# Patient Record
Sex: FEMALE | Race: OTHER | HISPANIC OR LATINO
[De-identification: names, ages, dates, MRNs, and addresses within clinical notes are randomized per-mention and may not be internally consistent; named-entity substitution may affect disease eponyms.]

---

## 2019-05-24 ENCOUNTER — APPOINTMENT (OUTPATIENT)
Dept: ANTEPARTUM | Facility: CLINIC | Age: 29
End: 2019-05-24
Payer: MEDICAID

## 2019-05-24 ENCOUNTER — ASOB RESULT (OUTPATIENT)
Age: 29
End: 2019-05-24

## 2019-05-24 PROBLEM — Z00.00 ENCOUNTER FOR PREVENTIVE HEALTH EXAMINATION: Status: ACTIVE | Noted: 2019-05-24

## 2019-05-24 PROCEDURE — 76830 TRANSVAGINAL US NON-OB: CPT

## 2019-05-24 PROCEDURE — 76857 US EXAM PELVIC LIMITED: CPT | Mod: 59

## 2020-06-10 ENCOUNTER — EMERGENCY (EMERGENCY)
Facility: HOSPITAL | Age: 30
LOS: 1 days | Discharge: HOME | End: 2020-06-10
Attending: STUDENT IN AN ORGANIZED HEALTH CARE EDUCATION/TRAINING PROGRAM
Payer: MEDICAID

## 2020-06-10 VITALS
DIASTOLIC BLOOD PRESSURE: 64 MMHG | HEIGHT: 66 IN | HEART RATE: 79 BPM | RESPIRATION RATE: 16 BRPM | WEIGHT: 164.91 LBS | SYSTOLIC BLOOD PRESSURE: 126 MMHG | TEMPERATURE: 98 F | OXYGEN SATURATION: 98 %

## 2020-06-10 LAB
ALBUMIN SERPL ELPH-MCNC: 3.9 G/DL — SIGNIFICANT CHANGE UP (ref 3.3–5.2)
ALP SERPL-CCNC: 40 U/L — SIGNIFICANT CHANGE UP (ref 40–120)
ALT FLD-CCNC: 16 U/L — SIGNIFICANT CHANGE UP
ANION GAP SERPL CALC-SCNC: 13 MMOL/L — SIGNIFICANT CHANGE UP (ref 5–17)
AST SERPL-CCNC: 21 U/L — SIGNIFICANT CHANGE UP
BASOPHILS # BLD AUTO: 0.03 K/UL — SIGNIFICANT CHANGE UP (ref 0–0.2)
BASOPHILS NFR BLD AUTO: 0.4 % — SIGNIFICANT CHANGE UP (ref 0–2)
BILIRUB SERPL-MCNC: <0.2 MG/DL — LOW (ref 0.4–2)
BUN SERPL-MCNC: 13 MG/DL — SIGNIFICANT CHANGE UP (ref 8–20)
CALCIUM SERPL-MCNC: 8.4 MG/DL — LOW (ref 8.6–10.2)
CHLORIDE SERPL-SCNC: 105 MMOL/L — SIGNIFICANT CHANGE UP (ref 98–107)
CO2 SERPL-SCNC: 21 MMOL/L — LOW (ref 22–29)
CREAT SERPL-MCNC: 0.52 MG/DL — SIGNIFICANT CHANGE UP (ref 0.5–1.3)
D DIMER BLD IA.RAPID-MCNC: <150 NG/ML DDU — SIGNIFICANT CHANGE UP
EOSINOPHIL # BLD AUTO: 0.13 K/UL — SIGNIFICANT CHANGE UP (ref 0–0.5)
EOSINOPHIL NFR BLD AUTO: 1.8 % — SIGNIFICANT CHANGE UP (ref 0–6)
GLUCOSE SERPL-MCNC: 78 MG/DL — SIGNIFICANT CHANGE UP (ref 70–99)
HCG SERPL-ACNC: <4 MIU/ML — SIGNIFICANT CHANGE UP
HCT VFR BLD CALC: 40.4 % — SIGNIFICANT CHANGE UP (ref 34.5–45)
HGB BLD-MCNC: 13.1 G/DL — SIGNIFICANT CHANGE UP (ref 11.5–15.5)
IMM GRANULOCYTES NFR BLD AUTO: 0.4 % — SIGNIFICANT CHANGE UP (ref 0–1.5)
LYMPHOCYTES # BLD AUTO: 2.45 K/UL — SIGNIFICANT CHANGE UP (ref 1–3.3)
LYMPHOCYTES # BLD AUTO: 33.7 % — SIGNIFICANT CHANGE UP (ref 13–44)
MCHC RBC-ENTMCNC: 29.2 PG — SIGNIFICANT CHANGE UP (ref 27–34)
MCHC RBC-ENTMCNC: 32.4 GM/DL — SIGNIFICANT CHANGE UP (ref 32–36)
MCV RBC AUTO: 90.2 FL — SIGNIFICANT CHANGE UP (ref 80–100)
MONOCYTES # BLD AUTO: 0.5 K/UL — SIGNIFICANT CHANGE UP (ref 0–0.9)
MONOCYTES NFR BLD AUTO: 6.9 % — SIGNIFICANT CHANGE UP (ref 2–14)
NEUTROPHILS # BLD AUTO: 4.12 K/UL — SIGNIFICANT CHANGE UP (ref 1.8–7.4)
NEUTROPHILS NFR BLD AUTO: 56.8 % — SIGNIFICANT CHANGE UP (ref 43–77)
PLATELET # BLD AUTO: 237 K/UL — SIGNIFICANT CHANGE UP (ref 150–400)
POTASSIUM SERPL-MCNC: 3.5 MMOL/L — SIGNIFICANT CHANGE UP (ref 3.5–5.3)
POTASSIUM SERPL-SCNC: 3.5 MMOL/L — SIGNIFICANT CHANGE UP (ref 3.5–5.3)
PROT SERPL-MCNC: 6.6 G/DL — SIGNIFICANT CHANGE UP (ref 6.6–8.7)
RBC # BLD: 4.48 M/UL — SIGNIFICANT CHANGE UP (ref 3.8–5.2)
RBC # FLD: 13.3 % — SIGNIFICANT CHANGE UP (ref 10.3–14.5)
SODIUM SERPL-SCNC: 139 MMOL/L — SIGNIFICANT CHANGE UP (ref 135–145)
TROPONIN T SERPL-MCNC: <0.01 NG/ML — SIGNIFICANT CHANGE UP (ref 0–0.06)
WBC # BLD: 7.26 K/UL — SIGNIFICANT CHANGE UP (ref 3.8–10.5)
WBC # FLD AUTO: 7.26 K/UL — SIGNIFICANT CHANGE UP (ref 3.8–10.5)

## 2020-06-10 PROCEDURE — 85027 COMPLETE CBC AUTOMATED: CPT

## 2020-06-10 PROCEDURE — 70450 CT HEAD/BRAIN W/O DYE: CPT

## 2020-06-10 PROCEDURE — 80053 COMPREHEN METABOLIC PANEL: CPT

## 2020-06-10 PROCEDURE — T1013: CPT

## 2020-06-10 PROCEDURE — 99285 EMERGENCY DEPT VISIT HI MDM: CPT

## 2020-06-10 PROCEDURE — 93005 ELECTROCARDIOGRAM TRACING: CPT

## 2020-06-10 PROCEDURE — 84484 ASSAY OF TROPONIN QUANT: CPT

## 2020-06-10 PROCEDURE — 84702 CHORIONIC GONADOTROPIN TEST: CPT

## 2020-06-10 PROCEDURE — 85379 FIBRIN DEGRADATION QUANT: CPT

## 2020-06-10 PROCEDURE — 99284 EMERGENCY DEPT VISIT MOD MDM: CPT

## 2020-06-10 PROCEDURE — 70450 CT HEAD/BRAIN W/O DYE: CPT | Mod: 26

## 2020-06-10 PROCEDURE — 93010 ELECTROCARDIOGRAM REPORT: CPT

## 2020-06-10 PROCEDURE — 71045 X-RAY EXAM CHEST 1 VIEW: CPT

## 2020-06-10 PROCEDURE — 36415 COLL VENOUS BLD VENIPUNCTURE: CPT

## 2020-06-10 PROCEDURE — 71045 X-RAY EXAM CHEST 1 VIEW: CPT | Mod: 26

## 2020-06-10 NOTE — ED PROVIDER NOTE - NS ED ROS FT
No fever/chills, No photophobia/eye pain/changes in vision, No ear pain/sore throat/dysphagia, + chest pain no palpitations, +SOB no cough/wheeze/stridor, No abdominal pain, No N/V/D, no dysuria/frequency/discharge, No neck/back pain, no rash, no changes in neurological status/function.

## 2020-06-10 NOTE — ED PROVIDER NOTE - PATIENT PORTAL LINK FT
You can access the FollowMyHealth Patient Portal offered by [facility] by registering at the following website: [email or web address]. By joining [employer]’s FollowMyHealth portal, you will also be able to view your health information using other applications (apps) compatible with our system.

## 2020-06-10 NOTE — ED ADULT NURSE REASSESSMENT NOTE - NS ED NURSE REASSESS COMMENT FT1
Pt awake, alert and talking on cell phone at this time.  Ct completed without incident.  Respirations unlabored.  Pt transported to A14R, handoff report given to RN Silvana.

## 2020-06-10 NOTE — ED PROVIDER NOTE - OBJECTIVE STATEMENT
Pt is a 28 yo F BIBEMS for unresponsive episode possible seizure.  EMS reports that they were called to scene for unresponsive female.  When they arrived she was unresponsive but she did wake up and start to answer questions. En route EMS reports patient had a short generalized tonic-clonic seizure for which they gave 5 mg of IV midazolam with cessation of seizure. Pt states that for the past few days she has had constant substernal nonradiating chest pain.  Pt states that the pain was worse today. no n/v. +sob.  no known covid.

## 2020-06-10 NOTE — ED ADULT TRIAGE NOTE - CHIEF COMPLAINT QUOTE
pt BIBA post-ictal per EMS, pt had new onset seizure witnessed by boyfriend at home. pt had been complaining of chest pain x 5 days. received versed 5mg iv by EMS. on arrival to ED, pt arousable to painful stimuli.  dr echeverria called to bedside upon arrival, pt brought to critical care

## 2020-06-10 NOTE — ED ADULT NURSE NOTE - INTERVENTIONS DEFINITIONS
Provide visual cue, wrist band, yellow gown, etc./Provide visual clues: red socks/[place] to call system/Call bell, personal items and telephone within reach/Instruct patient to call for assistance

## 2020-06-10 NOTE — ED ADULT NURSE NOTE - OBJECTIVE STATEMENT
As per EMS pt had new onset seizure activity while in car with boyfriend, additional seizure activity witnessed by EMS, 5mg versed IVP given.  Pt responsive to verbal stimuli at this time, following commands.  Pt states she has been having chest pains for the past few days.

## 2020-06-10 NOTE — ED PROVIDER NOTE - CLINICAL SUMMARY MEDICAL DECISION MAKING FREE TEXT BOX
labs and imaging reviewed. Pt feeling much better. uncertain if patient had seizure so will not start antiepileptic. Pt instructed to refrain from driving until she is seen and cleared by a neurologist. instructed to return for worsening chest pain, sob, or any other concerns.  Pt given a copy of all results and instructed to f/up with pcp regarding any abnormal results.

## 2020-07-01 ENCOUNTER — EMERGENCY (EMERGENCY)
Facility: HOSPITAL | Age: 30
LOS: 1 days | Discharge: HOME | End: 2020-07-01
Attending: STUDENT IN AN ORGANIZED HEALTH CARE EDUCATION/TRAINING PROGRAM
Payer: MEDICAID

## 2020-07-01 VITALS
WEIGHT: 205.03 LBS | RESPIRATION RATE: 16 BRPM | TEMPERATURE: 98 F | HEART RATE: 92 BPM | OXYGEN SATURATION: 98 % | SYSTOLIC BLOOD PRESSURE: 114 MMHG | HEIGHT: 66 IN | DIASTOLIC BLOOD PRESSURE: 78 MMHG

## 2020-07-01 LAB
ALBUMIN SERPL ELPH-MCNC: 3.8 G/DL — SIGNIFICANT CHANGE UP (ref 3.3–5.2)
ALP SERPL-CCNC: 45 U/L — SIGNIFICANT CHANGE UP (ref 40–120)
ALT FLD-CCNC: 17 U/L — SIGNIFICANT CHANGE UP
ANION GAP SERPL CALC-SCNC: 10 MMOL/L — SIGNIFICANT CHANGE UP (ref 5–17)
AST SERPL-CCNC: 20 U/L — SIGNIFICANT CHANGE UP
BASOPHILS # BLD AUTO: 0.04 K/UL — SIGNIFICANT CHANGE UP (ref 0–0.2)
BASOPHILS NFR BLD AUTO: 0.5 % — SIGNIFICANT CHANGE UP (ref 0–2)
BILIRUB SERPL-MCNC: <0.2 MG/DL — LOW (ref 0.4–2)
BUN SERPL-MCNC: 10 MG/DL — SIGNIFICANT CHANGE UP (ref 8–20)
CALCIUM SERPL-MCNC: 9.4 MG/DL — SIGNIFICANT CHANGE UP (ref 8.6–10.2)
CHLORIDE SERPL-SCNC: 104 MMOL/L — SIGNIFICANT CHANGE UP (ref 98–107)
CO2 SERPL-SCNC: 26 MMOL/L — SIGNIFICANT CHANGE UP (ref 22–29)
CREAT SERPL-MCNC: 0.61 MG/DL — SIGNIFICANT CHANGE UP (ref 0.5–1.3)
D DIMER BLD IA.RAPID-MCNC: <150 NG/ML DDU — SIGNIFICANT CHANGE UP
EOSINOPHIL # BLD AUTO: 0.2 K/UL — SIGNIFICANT CHANGE UP (ref 0–0.5)
EOSINOPHIL NFR BLD AUTO: 2.5 % — SIGNIFICANT CHANGE UP (ref 0–6)
GLUCOSE SERPL-MCNC: 98 MG/DL — SIGNIFICANT CHANGE UP (ref 70–99)
HCG SERPL-ACNC: <4 MIU/ML — SIGNIFICANT CHANGE UP
HCT VFR BLD CALC: 39.3 % — SIGNIFICANT CHANGE UP (ref 34.5–45)
HGB BLD-MCNC: 12.8 G/DL — SIGNIFICANT CHANGE UP (ref 11.5–15.5)
IMM GRANULOCYTES NFR BLD AUTO: 0.4 % — SIGNIFICANT CHANGE UP (ref 0–1.5)
LYMPHOCYTES # BLD AUTO: 2.88 K/UL — SIGNIFICANT CHANGE UP (ref 1–3.3)
LYMPHOCYTES # BLD AUTO: 35.8 % — SIGNIFICANT CHANGE UP (ref 13–44)
MAGNESIUM SERPL-MCNC: 1.9 MG/DL — SIGNIFICANT CHANGE UP (ref 1.6–2.6)
MCHC RBC-ENTMCNC: 29.2 PG — SIGNIFICANT CHANGE UP (ref 27–34)
MCHC RBC-ENTMCNC: 32.6 GM/DL — SIGNIFICANT CHANGE UP (ref 32–36)
MCV RBC AUTO: 89.5 FL — SIGNIFICANT CHANGE UP (ref 80–100)
MONOCYTES # BLD AUTO: 0.57 K/UL — SIGNIFICANT CHANGE UP (ref 0–0.9)
MONOCYTES NFR BLD AUTO: 7.1 % — SIGNIFICANT CHANGE UP (ref 2–14)
NEUTROPHILS # BLD AUTO: 4.32 K/UL — SIGNIFICANT CHANGE UP (ref 1.8–7.4)
NEUTROPHILS NFR BLD AUTO: 53.7 % — SIGNIFICANT CHANGE UP (ref 43–77)
PLATELET # BLD AUTO: 205 K/UL — SIGNIFICANT CHANGE UP (ref 150–400)
POTASSIUM SERPL-MCNC: 4 MMOL/L — SIGNIFICANT CHANGE UP (ref 3.5–5.3)
POTASSIUM SERPL-SCNC: 4 MMOL/L — SIGNIFICANT CHANGE UP (ref 3.5–5.3)
PROT SERPL-MCNC: 6.7 G/DL — SIGNIFICANT CHANGE UP (ref 6.6–8.7)
RBC # BLD: 4.39 M/UL — SIGNIFICANT CHANGE UP (ref 3.8–5.2)
RBC # FLD: 13.4 % — SIGNIFICANT CHANGE UP (ref 10.3–14.5)
SODIUM SERPL-SCNC: 140 MMOL/L — SIGNIFICANT CHANGE UP (ref 135–145)
TROPONIN T SERPL-MCNC: <0.01 NG/ML — SIGNIFICANT CHANGE UP (ref 0–0.06)
WBC # BLD: 8.04 K/UL — SIGNIFICANT CHANGE UP (ref 3.8–10.5)
WBC # FLD AUTO: 8.04 K/UL — SIGNIFICANT CHANGE UP (ref 3.8–10.5)

## 2020-07-01 PROCEDURE — 93010 ELECTROCARDIOGRAM REPORT: CPT

## 2020-07-01 PROCEDURE — 84484 ASSAY OF TROPONIN QUANT: CPT

## 2020-07-01 PROCEDURE — 93005 ELECTROCARDIOGRAM TRACING: CPT

## 2020-07-01 PROCEDURE — 36415 COLL VENOUS BLD VENIPUNCTURE: CPT

## 2020-07-01 PROCEDURE — 99285 EMERGENCY DEPT VISIT HI MDM: CPT

## 2020-07-01 PROCEDURE — 80053 COMPREHEN METABOLIC PANEL: CPT

## 2020-07-01 PROCEDURE — 85027 COMPLETE CBC AUTOMATED: CPT

## 2020-07-01 PROCEDURE — 84702 CHORIONIC GONADOTROPIN TEST: CPT

## 2020-07-01 PROCEDURE — 71045 X-RAY EXAM CHEST 1 VIEW: CPT | Mod: 26

## 2020-07-01 PROCEDURE — 99283 EMERGENCY DEPT VISIT LOW MDM: CPT | Mod: 25

## 2020-07-01 PROCEDURE — 83735 ASSAY OF MAGNESIUM: CPT

## 2020-07-01 PROCEDURE — 85379 FIBRIN DEGRADATION QUANT: CPT

## 2020-07-01 PROCEDURE — 71045 X-RAY EXAM CHEST 1 VIEW: CPT

## 2020-07-01 NOTE — ED STATDOCS - CLINICAL SUMMARY MEDICAL DECISION MAKING FREE TEXT BOX
Pt with SOB and chest pain that began 2 hours ago. Will check labs including D-dimer, 1 troponin, EKG, CXR and reassess.

## 2020-07-01 NOTE — ED ADULT NURSE NOTE - NSIMPLEMENTINTERV_GEN_ALL_ED
Implemented All Universal Safety Interventions:  [place] to call system. Call bell, personal items and telephone within reach. Instruct patient to call for assistance. Room bathroom lighting operational. Non-slip footwear when patient is off stretcher. Physically safe environment: no spills, clutter or unnecessary equipment. Stretcher in lowest position, wheels locked, appropriate side rails in place.

## 2020-07-01 NOTE — ED STATDOCS - PROGRESS NOTE DETAILS
PT evaluated by intake physician. HPI/PE/ROS as noted above. Will follow up plan per intake physician. Pt feeling better. results reviewed. Will dc with PCP f/u. Return precautions given.

## 2020-07-01 NOTE — ED STATDOCS - OBJECTIVE STATEMENT
30 y/o F with no pertinent PMHx, presents to the ED c/o severe chest pressure and difficulty breathing that began x2 hours ago while at work. Pt notes "stabbing" pain and swelling to L leg. She reports pain in her L side of her body. She states she had similar sx 3 weeks ago. Pt states she saw her PCP and is waiting for the results from the tests. Denies having covid-19. Denies taking birth control pills. Denies possible pregnancy. NKDA.

## 2020-07-01 NOTE — ED STATDOCS - NSFOLLOWUPINSTRUCTIONS_ED_ALL_ED_FT
Follow up with PMD.   Come back with new or worsening symptoms.  Shortness of breath    Shortness of breath (dyspnea) means you have trouble breathing and could indicate a medical problem. Causes include lung disease, heart disease, low amount of red blood cells (anemia), poor physical fitness, being overweight, smoking, etc. Your health care provider today may not be able to find a cause for your shortness of breath after your exam. In this case, it is important to have a follow-up exam with your primary care physician as instructed. If medicines were prescribed, take them as directed for the full length of time directed. Refrain from tobacco products.    SEEK IMMEDIATE MEDICAL CARE IF YOU HAVE ANY OF THE FOLLOWING SYMPTOMS: worsening shortness of breath, chest pain, back pain, abdominal pain, fever, coughing up blood, lightheadedness/dizziness.

## 2020-07-01 NOTE — ED ADULT TRIAGE NOTE - CHIEF COMPLAINT QUOTE
Patient A&Ox4 complaining of shortness of breath & chest pain that began x2 hours ago while at work. Also complaining of pain & swelling to left leg.

## 2020-07-01 NOTE — ED STATDOCS - ATTENDING CONTRIBUTION TO CARE
I performed a face to face history and physical exam of the patient and discussed their management with the resident/ACP. I reviewed the resident/ACP's note and agree with the documented findings and plan of care.    labs and imaging reviewed.  Pt reassured.  likely anxiety. Pt instructed to f/up with pcp in 1-2 days.

## 2020-07-01 NOTE — ED STATDOCS - NS ED ROS FT
No fever/chills, No photophobia/eye pain/changes in vision, No ear pain/sore throat/dysphagia, (+)CP, (+)SOB. No cough/wheeze/stridor, No abdominal pain, No N/V/D, no dysuria/frequency/discharge, (+) L leg pain and swelling. No neck/back pain, no rash, no changes in neurological status/function.

## 2020-07-01 NOTE — ED ADULT NURSE NOTE - OBJECTIVE STATEMENT
pt presented to ED with multiple medical complaints. Ed  at bedside. Pt reports she has left leg, chest pain and SOB for 3 weeks. Stated she has a hx of anemia and "I had some diagnostic stuff done once and they told me something with the heart but I have no diagnosis." Pt resting comfortably in no respiratory distress at this time. Currently talking on the phone. Pt seen by MD, EKG done, labs drawn and sent. Awaiting results.

## 2020-07-01 NOTE — ED STATDOCS - PATIENT PORTAL LINK FT
You can access the FollowMyHealth Patient Portal offered by [facility] by registering at the following website: [email or web address]. By joining [employer]’s FollowMyHealth portal, you will also be able to view your health information using other applications (apps) compatible with our system.

## 2022-06-14 NOTE — ED ADULT NURSE NOTE - NS PRO PASSIVE SMOKE EXP
----- Message from Ascencion Muñoz, [address] sent at 6/13/2022  5:33 PM EDT -----  Can you please reach out to pulmonary to try to get her in to see them sooner  Also reach out to her I need her to have her pfts done       Thanks so much Unknown

## 2022-10-27 NOTE — ED ADULT TRIAGE NOTE - ACCOMPANIED BY
FAMILY PRACTICE ACUTE OFFICE VISIT  [facility]       NAME: Cesar Alvarado  AGE: 26 y o  SEX: female       : 2003        MRN: [id number]    DATE: 10/30/2022  TIME: 8:56 PM    Assessment and Plan     Problem List Items Addressed This Visit        Cardiovascular and Mediastinum    Migraine without status migrainosus, not intractable - Primary     New onset over the last several months  Will try starting magnesium 400 mg daily for prevention  We also discussed possible triggers for migraines  She was encouraged to start journaling 1 her migraines occur and any potential triggers that might have contributed to that particular migraine   (A handout was also provided for reference )  She was encouraged to discuss a possible change in birth control her when she sees her gynecologist next month  She can continue to use acetaminophen or ibuprofen as needed for migraines when they occur her in addition to resting  Will follow-up in 2 months to re-evaluate  She should call with any concerns in the interim  Relevant Medications    Magnesium Oxide 400 MG CAPS    Other Relevant Orders    Magnesium    Comprehensive metabolic panel       Other    History of iron deficiency     Not currently supplementing  Recheck with labs  Relevant Orders    CBC and differential    Ferritin    Iron    TIBC    Fatigue     Check labs as ordered  Relevant Orders    CBC and differential    TSH, 3rd generation with Free T4 reflex    B12 deficiency     Not currently supplementing  Recheck with labs  Relevant Orders    Vitamin B12    History of vitamin D deficiency     Not currently supplementing  Recheck with labs            Relevant Orders    Vitamin D 25 hydroxy      Other Visit Diagnoses     Screening for HIV (human immunodeficiency virus)        Relevant Orders    HIV 1/2 Antigen/Antibody (4th Generation) w Reflex [facility]    Need for hepatitis C screening test        Relevant Orders    Hepatitis C antibody    Influenza vaccine needed        Relevant Orders    influenza vaccine, quadrivalent, 0 5 mL, preservative-free, for adult and pediatric patients 6 mos+ (AFLURIA, FLUARIX, FLULAVAL, FLUZONE) (Completed)      The USPSTF recommendation for HIV screening in all patients between 13and 72years old (once in lifetime or annually with risk factors) was discussed with the patient  The patient agreed to testing  Migraine without status migrainosus, not intractable  New onset over the last several months  Will try starting magnesium 400 mg daily for prevention  We also discussed possible triggers for migraines  She was encouraged to start journaling 1 her migraines occur and any potential triggers that might have contributed to that particular migraine   (A handout was also provided for reference )  She was encouraged to discuss a possible change in birth control her when she sees her gynecologist next month  She can continue to use acetaminophen or ibuprofen as needed for migraines when they occur her in addition to resting  Will follow-up in 2 months to re-evaluate  She should call with any concerns in the interim  History of iron deficiency  Not currently supplementing  Recheck with labs  B12 deficiency  Not currently supplementing  Recheck with labs  History of vitamin D deficiency  Not currently supplementing  Recheck with labs  Fatigue  Check labs as ordered  Chief Complaint     Chief Complaint   Patient presents with   • Headache     2 months       History of Present Illness   Domenica Vanessa is a 26y o -year-old female who presents for headaches for the last 2 months  Patient notes that she has been getting headaches over the last several months  She now gets them twice weekly for the most part  She notes they are severe and might last almost a day or could be 40 minutes   She normally wants to sleep and put her head down  She notes that she has acetaminophen at times or ibuprofen  She notes that notes that he appetite drops with them  She denies numbness or tingling or vomiting with them  She thinks fatigue significantly before the headaches  She notes that her focus goes  She wears contacts and sees Ophthalmology annually  She gets severe pressure in the head both sides and then goes under the left eye  She consistently sleeps 8+ hours of sleep and naps  She has a balanced diet and drinks a lot of water  Sometimes with standing her vision get dark and cloudy at times and feels unsteady  It resolves within a few seconds and happens about every other week  Headache        Review of Systems   Review of Systems   Neurological: Positive for headaches         Active Problem List     Patient Active Problem List   Diagnosis   • Dysmenorrhea   • Menorrhagia with regular cycle   • Migraine without status migrainosus, not intractable   • History of iron deficiency   • Fatigue   • B12 deficiency   • History of vitamin D deficiency         Social History:  Social History     Socioeconomic History   • Marital status: Single     Spouse name: Not on file   • Number of children: Not on file   • Years of education: Not on file   • Highest education level: Not on file   Occupational History   • Not on file   Tobacco Use   • Smoking status: Never Smoker   • Smokeless tobacco: Never Used   Vaping Use   • Vaping Use: Never used   Substance and Sexual Activity   • Alcohol use: Never   • Drug use: Never   • Sexual activity: Yes     Partners: Male     Birth control/protection: OCP   Other Topics Concern   • Not on file   Social History Narrative   • Not on file     Social Determinants of Health     Financial Resource Strain: Not on file   Food Insecurity: Not on file   Transportation Needs: Not on file   Physical Activity: Not on file   Stress: Not on file   Social Connections: Not on file   Intimate Partner Violence: Not on file   Housing Stability: Not on file       Objective     Vitals:    10/28/22 1440   BP: 100/62   BP Location: Right arm   Patient Position: Sitting   Cuff Size: Standard   Pulse: 76   Temp: (!) 96 9 °F (36 1 °C)   TempSrc: Tympanic   SpO2: 100%   Weight: 73 5 kg (162 lb)     Wt Readings from Last 3 Encounters:   10/28/22 73 5 kg (162 lb) (91 %, Z= 1 32)*   08/20/22 74 8 kg (165 lb) (92 %, Z= 1 40)*   06/15/22 72 2 kg (159 lb 3 2 oz) (90 %, Z= 1 27)*     * Growth percentiles are based on CDC (Girls, 2-20 Years) data  Physical Exam  Vitals reviewed  Constitutional:       General: She is not in acute distress  Appearance: Normal appearance  She is well-developed and normal weight  She is not ill-appearing  HENT:      Head: Normocephalic and atraumatic  Right Ear: External ear normal       Left Ear: External ear normal    Eyes:      Extraocular Movements: Extraocular movements intact  Conjunctiva/sclera: Conjunctivae normal       Pupils: Pupils are equal, round, and reactive to light  Cardiovascular:      Rate and Rhythm: Normal rate and regular rhythm  Pulses: Normal pulses  Heart sounds: Normal heart sounds  No murmur heard  Pulmonary:      Effort: Pulmonary effort is normal       Breath sounds: Normal breath sounds  No wheezing, rhonchi or rales  Abdominal:      General: Bowel sounds are normal       Palpations: Abdomen is soft  Musculoskeletal:         General: Normal range of motion  Cervical back: Normal range of motion and neck supple  Neurological:      Mental Status: She is alert  Sensory: No sensory deficit  Motor: No weakness  Coordination: Coordination is intact  Romberg sign negative  Psychiatric:         Mood and Affect: Mood normal          Behavior: Behavior normal          Thought Content:  Thought content normal          Judgment: Judgment normal          Pertinent Laboratory/Diagnostic Studies:  Results for orders placed or performed in visit on 01/10/22   COVID Only - Office Collect    Specimen: Nose; Nares   Result Value Ref Range    SARS-CoV-2 Positive (A) Negative       Orders Placed This Encounter   Procedures   • influenza vaccine, quadrivalent, 0 5 mL, preservative-free, for adult and pediatric patients 6 mos+ (AFLURIA, FLUARIX, FLULAVAL, FLUZONE)   • CBC and differential   • Ferritin   • Iron   • TIBC   • TSH, 3rd generation with Free T4 reflex   • Magnesium   • Comprehensive metabolic panel   • Vitamin B12   • Vitamin D 25 hydroxy   • HIV 1/2 Antigen/Antibody (4th Generation) w Reflex [facility]   • Hepatitis C antibody       ALLERGIES:  Allergies   Allergen Reactions   • Latex Itching       Current Medications     Current Outpatient Medications   Medication Sig Dispense Refill   • Junel FE 1/20 1-20 MG-MCG per tablet TAKE ONE TABLET BY MOUTH EVERY DAY 84 tablet 0   • Magnesium Oxide 400 MG CAPS Take 1 tablet (400 mg total) by mouth in the morning 30 capsule 5   • triamcinolone (KENALOG) 0 025 % cream Apply topically 2 (two) times a day To affected area for up to 10-14 days; avoid face/genitals 30 g 0     No current facility-administered medications for this visit           Chela Licona  10/30/2022 8:56 PM  [facility] Self

## 2023-03-06 NOTE — ED STATDOCS - NS ED SCRIBE STATEMENT
----- Message from SONIA Mtz sent at 3/6/2023  4:51 PM CST -----  UA with small amount of ketones and squamous cells. There is no signs of infection. I would encourage she increase water. Will need to be seen if urinary symptoms persist.    Attending

## 2023-04-30 ENCOUNTER — EMERGENCY (EMERGENCY)
Facility: HOSPITAL | Age: 33
LOS: 1 days | Discharge: HOME | End: 2023-04-30
Attending: STUDENT IN AN ORGANIZED HEALTH CARE EDUCATION/TRAINING PROGRAM
Payer: MEDICAID

## 2023-04-30 VITALS
RESPIRATION RATE: 18 BRPM | DIASTOLIC BLOOD PRESSURE: 66 MMHG | SYSTOLIC BLOOD PRESSURE: 103 MMHG | OXYGEN SATURATION: 99 % | TEMPERATURE: 98 F | WEIGHT: 177.03 LBS | HEIGHT: 66 IN | HEART RATE: 76 BPM

## 2023-04-30 PROCEDURE — 99284 EMERGENCY DEPT VISIT MOD MDM: CPT

## 2023-04-30 NOTE — ED ADULT TRIAGE NOTE - CHIEF COMPLAINT QUOTE
patient 10 weeks pregnant, states since friday she has had worsening lower abdominal pain. 2 days of N/V/D. LMP 2/15/23. patient 10 weeks pregnant with twins, states since friday she has had worsening lower abdominal pain. 2 days of N/V/D. no vaginal bleeding or discharge noted. LMP 2/15/23.

## 2023-05-01 VITALS
DIASTOLIC BLOOD PRESSURE: 66 MMHG | SYSTOLIC BLOOD PRESSURE: 96 MMHG | OXYGEN SATURATION: 99 % | RESPIRATION RATE: 17 BRPM | HEART RATE: 61 BPM | TEMPERATURE: 98 F

## 2023-05-01 DIAGNOSIS — Z98.84 BARIATRIC SURGERY STATUS: Chronic | ICD-10-CM

## 2023-05-01 LAB
ALBUMIN SERPL ELPH-MCNC: 3.8 G/DL — SIGNIFICANT CHANGE UP (ref 3.3–5.2)
ALP SERPL-CCNC: 40 U/L — SIGNIFICANT CHANGE UP (ref 40–120)
ALT FLD-CCNC: 12 U/L — SIGNIFICANT CHANGE UP
ANION GAP SERPL CALC-SCNC: 14 MMOL/L — SIGNIFICANT CHANGE UP (ref 5–17)
APPEARANCE UR: CLEAR — SIGNIFICANT CHANGE UP
AST SERPL-CCNC: 17 U/L — SIGNIFICANT CHANGE UP
BASOPHILS # BLD AUTO: 0.03 K/UL — SIGNIFICANT CHANGE UP (ref 0–0.2)
BASOPHILS NFR BLD AUTO: 0.3 % — SIGNIFICANT CHANGE UP (ref 0–2)
BILIRUB SERPL-MCNC: <0.2 MG/DL — LOW (ref 0.4–2)
BILIRUB UR-MCNC: NEGATIVE — SIGNIFICANT CHANGE UP
BUN SERPL-MCNC: 5.4 MG/DL — LOW (ref 8–20)
CALCIUM SERPL-MCNC: 9.4 MG/DL — SIGNIFICANT CHANGE UP (ref 8.4–10.5)
CHLORIDE SERPL-SCNC: 102 MMOL/L — SIGNIFICANT CHANGE UP (ref 96–108)
CO2 SERPL-SCNC: 19 MMOL/L — LOW (ref 22–29)
COLOR SPEC: YELLOW — SIGNIFICANT CHANGE UP
CREAT SERPL-MCNC: 0.55 MG/DL — SIGNIFICANT CHANGE UP (ref 0.5–1.3)
DIFF PNL FLD: NEGATIVE — SIGNIFICANT CHANGE UP
EGFR: 126 ML/MIN/1.73M2 — SIGNIFICANT CHANGE UP
EOSINOPHIL # BLD AUTO: 0.06 K/UL — SIGNIFICANT CHANGE UP (ref 0–0.5)
EOSINOPHIL NFR BLD AUTO: 0.6 % — SIGNIFICANT CHANGE UP (ref 0–6)
GLUCOSE SERPL-MCNC: 87 MG/DL — SIGNIFICANT CHANGE UP (ref 70–99)
GLUCOSE UR QL: NEGATIVE MG/DL — SIGNIFICANT CHANGE UP
HCG SERPL-ACNC: HIGH MIU/ML
HCT VFR BLD CALC: 35.3 % — SIGNIFICANT CHANGE UP (ref 34.5–45)
HGB BLD-MCNC: 11.8 G/DL — SIGNIFICANT CHANGE UP (ref 11.5–15.5)
IMM GRANULOCYTES NFR BLD AUTO: 0.5 % — SIGNIFICANT CHANGE UP (ref 0–0.9)
KETONES UR-MCNC: ABNORMAL
LEUKOCYTE ESTERASE UR-ACNC: NEGATIVE — SIGNIFICANT CHANGE UP
LYMPHOCYTES # BLD AUTO: 2.82 K/UL — SIGNIFICANT CHANGE UP (ref 1–3.3)
LYMPHOCYTES # BLD AUTO: 29 % — SIGNIFICANT CHANGE UP (ref 13–44)
MCHC RBC-ENTMCNC: 29.6 PG — SIGNIFICANT CHANGE UP (ref 27–34)
MCHC RBC-ENTMCNC: 33.4 GM/DL — SIGNIFICANT CHANGE UP (ref 32–36)
MCV RBC AUTO: 88.5 FL — SIGNIFICANT CHANGE UP (ref 80–100)
MONOCYTES # BLD AUTO: 0.55 K/UL — SIGNIFICANT CHANGE UP (ref 0–0.9)
MONOCYTES NFR BLD AUTO: 5.7 % — SIGNIFICANT CHANGE UP (ref 2–14)
NEUTROPHILS # BLD AUTO: 6.21 K/UL — SIGNIFICANT CHANGE UP (ref 1.8–7.4)
NEUTROPHILS NFR BLD AUTO: 63.9 % — SIGNIFICANT CHANGE UP (ref 43–77)
NITRITE UR-MCNC: NEGATIVE — SIGNIFICANT CHANGE UP
PH UR: 6.5 — SIGNIFICANT CHANGE UP (ref 5–8)
PLATELET # BLD AUTO: 221 K/UL — SIGNIFICANT CHANGE UP (ref 150–400)
POTASSIUM SERPL-MCNC: 3.7 MMOL/L — SIGNIFICANT CHANGE UP (ref 3.5–5.3)
POTASSIUM SERPL-SCNC: 3.7 MMOL/L — SIGNIFICANT CHANGE UP (ref 3.5–5.3)
PROT SERPL-MCNC: 6.4 G/DL — LOW (ref 6.6–8.7)
PROT UR-MCNC: NEGATIVE — SIGNIFICANT CHANGE UP
RBC # BLD: 3.99 M/UL — SIGNIFICANT CHANGE UP (ref 3.8–5.2)
RBC # FLD: 14.4 % — SIGNIFICANT CHANGE UP (ref 10.3–14.5)
SODIUM SERPL-SCNC: 135 MMOL/L — SIGNIFICANT CHANGE UP (ref 135–145)
SP GR SPEC: 1.01 — SIGNIFICANT CHANGE UP (ref 1.01–1.02)
UROBILINOGEN FLD QL: NEGATIVE MG/DL — SIGNIFICANT CHANGE UP
WBC # BLD: 9.72 K/UL — SIGNIFICANT CHANGE UP (ref 3.8–10.5)
WBC # FLD AUTO: 9.72 K/UL — SIGNIFICANT CHANGE UP (ref 3.8–10.5)

## 2023-05-01 PROCEDURE — 81003 URINALYSIS AUTO W/O SCOPE: CPT

## 2023-05-01 PROCEDURE — 87086 URINE CULTURE/COLONY COUNT: CPT

## 2023-05-01 PROCEDURE — 76801 OB US < 14 WKS SINGLE FETUS: CPT

## 2023-05-01 PROCEDURE — 80053 COMPREHEN METABOLIC PANEL: CPT

## 2023-05-01 PROCEDURE — 76815 OB US LIMITED FETUS(S): CPT | Mod: 26

## 2023-05-01 PROCEDURE — T1013: CPT

## 2023-05-01 PROCEDURE — 36415 COLL VENOUS BLD VENIPUNCTURE: CPT

## 2023-05-01 PROCEDURE — 85025 COMPLETE CBC W/AUTO DIFF WBC: CPT

## 2023-05-01 PROCEDURE — 84702 CHORIONIC GONADOTROPIN TEST: CPT

## 2023-05-01 PROCEDURE — 76802 OB US < 14 WKS ADDL FETUS: CPT

## 2023-05-01 PROCEDURE — 99284 EMERGENCY DEPT VISIT MOD MDM: CPT

## 2023-05-01 RX ORDER — ACETAMINOPHEN 500 MG
650 TABLET ORAL ONCE
Refills: 0 | Status: COMPLETED | OUTPATIENT
Start: 2023-05-01 | End: 2023-05-01

## 2023-05-01 RX ORDER — NITROFURANTOIN MACROCRYSTAL 50 MG
1 CAPSULE ORAL
Qty: 10 | Refills: 0
Start: 2023-05-01 | End: 2023-05-05

## 2023-05-01 RX ADMIN — Medication 650 MILLIGRAM(S): at 00:28

## 2023-05-01 NOTE — ED PROVIDER NOTE - OBJECTIVE STATEMENT
PT with SPMHX of  gastric bypass approx 10 wk preg  with twins that has chaparro followed by out pt OBGYN  presents to the ED with complaint of lower abd pain x2 days. Pt states that she has had a constat, progressively worse lower abd pain that is in the middle of her abd, that feels like pressure that is not made better or worse by anything, Pt admits to burning with urination. Pt dines N/V, vag bleeding, vag discharge,  abd cramping,

## 2023-05-01 NOTE — ED ADULT NURSE NOTE - NS ED NURSE RECORD ANOTHER HT AND WT
Registered Dietitian Follow-Up     Patient Profile Reviewed                           Yes [x]   No []     Nutrition History Previously Obtained        Yes []  No [x] Pt reports po/appetite >75% PTA. NKFA. Takes Vit D. UBW: 140.9kg vs. wt at admit 164.2kg vs. wts fluctuating likely d/t edema. No cul/rel or personal food rest/prefs noted.      Pertinent Subjective Information: pt reports suboptimal po/appetite d/t disliking food at the hospital. Says "the food is inedible." Thinks he has lost wt at during his admit. discussed nutritional supplements -- agreeable to add.      Pertinent Medical Interventions:  #Acute hypoxemic respiratory failure secondary to septic shock due to E. 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   #CANDY on CKD III/possible ATN -- Continue to monitor renal function  #dysphagia 2 w/ honey-thick liquids; 1:1 feed per SLP   #Stage 3 b/l buttock pressure injuries per wound care RN      Diet order: Diet, Dysphagia 2 Mechanical Soft-Honey Consistency Fluid:   Supplement Feeding Modality:  Oral  Ensure Max Cans or Servings Per Day:  2       Frequency:  Two Times a day (21 @ 18:20) [Active]    Anthropometrics:  Height (cm): 175.26 cm  Weight (kg): 161 kg () - no significant wt changes observed following previous RD assessment; pt on lasix + w/ edema as wt fluctuations noted below   BMI (kg/m2): 53.6 (06-10-21 @ 16:05) using 175.26 cm. (original height) and dosing wt 164.2 kg (6/10)  IBW: 72.7 kg    Daily Weight in k.7 (-), Weight in k.1 (), Weight in k (), Weight in k (18), Weight in k.2 (17), Weight in k.4 ()    MEDICATIONS  (STANDING):  ALBUTerol    90 MICROgram(s) HFA Inhaler 1 Puff(s) Inhalation once  DAPTOmycin IVPB 875 milliGRAM(s) IV Intermittent every 24 hours  furosemide    Tablet 40 milliGRAM(s) Oral daily  glucagon  Injectable 1 milliGRAM(s) IntraMuscular once  heparin   Injectable 5000 Unit(s) SubCutaneous every 8 hours  insulin glargine Injectable (LANTUS) 18 Unit(s) SubCutaneous at bedtime  insulin lispro (ADMELOG) corrective regimen sliding scale   SubCutaneous every 6 hours  insulin lispro Injectable (ADMELOG) 5 Unit(s) SubCutaneous every 8 hours  magnesium oxide 400 milliGRAM(s) Oral three times a day with meals  pantoprazole   Suspension 40 milliGRAM(s) Oral daily  tiotropium 18 MICROgram(s) Capsule 1 Capsule(s) Inhalation once    Pertinent Labs:  @ 07:46: Na --, BUN --, Cr --, BG --, K+ --, Phos --, Mg 1.6<L>, Alk Phos --, ALT/SGPT --, AST/SGOT --, HbA1c --    Finger Sticks:  POCT Blood Glucose.: 133 mg/dL ( @ 07:40)  POCT Blood Glucose.: 159 mg/dL ( @ 23:11)  POCT Blood Glucose.: 188 mg/dL ( @ 16:29)  POCT Blood Glucose.: 156 mg/dL ( @ 11:31)    Physical Findings:  - Appearance: 1+ generalized edema; Alert, forgetful @ times -- answered all questions appropriately   - GI function: no discomfort reported, LBM  per pt   - Tubes: n/a   - Oral/Mouth cavity: tolerating current diet texture   - Skin: Stage III PU b/l buttocks      Nutrition Requirements: adjusted needs d/t Pressure injuries   Weight Used: IBW 73kg d/t obese BMI      Estimated Energy Needs    Continue []  Adjust [x]  2190-2555kcal (30-35kcal -- d/t PU's, aim towards upper end d/t difference btwn ABW/IBW)      Estimated Protein Needs    Continue []  Adjust [x]  88-110g/kg (1.2-1.5g/kg -- same reason as above, renal labs wnl)      Estimated Fluid Needs        Continue []  Adjust [x]  per CEU team      Nutrient Intake: 50-75% per pt      Previous Nutrition Diagnosis: Inadequate protein/energy intake (ongoing)  Nutrition Diagnosis #2: Increased nutrient needs   Etiology: r/t wound healing   S/S: AEB PU's to B/L buttocks      Nutrition Intervention medical food supplements, meals and snacks, vitamin and mineral supplements      Goal/Expected Outcome: Pt to demonstrate tolerance to diet order with at least 75% po intake achieved over next 3 days.     Indicator/Monitoring: Diet order, energy intake, body composition, NFPF, glucose/renal/electrolyte profiles.    Recommendation:  1. Add Glucerna BID -- will change upon improvement in PO d/t fluid build-up.   2. Add Prosource gelatein 20 SF daily   3. Add CHO consistent diet modifier to current [facility] diet order   4. Hold-off on DASH/TLC diet modifier d/t poor po   5. Obtain daily wts   6. Max encouragement appreciated during meals   7. Add daily MVI w/o minerals if medically feasible    [phone]  pt @ risk, RD to f/u in 3 days.   RD remains available: Patricia Posey [phone] Yes

## 2023-05-01 NOTE — ED PROVIDER NOTE - ADDITIONAL NOTES AND INSTRUCTIONS:
PT was evaluated At [facility] ED and was found to have a condition that warranted time of to rest and heal from WORK/SCHOOL.   Jude Coh PA-C

## 2023-05-01 NOTE — ED PROVIDER NOTE - NS ED ROS FT
ROS: CONTUSIONAL: Denies fever, chills, fatigue, wt loss. HEAD: Denies trauma, HA, Dizziness. EYE: Denies Acute visual changes, diplopia. ENMT: Denies change in hearing, tinnitus, epistaxis, difficulty swallowing, sore throat. CARDIO: Denies CP, palpitations, edema. RESP: Denies Cough, SOB , Diff breathing, hemoptysis. GI:  ABD pain, Denies N/V, change in bowel movement. URINARY: Denies difficulty urinating, pelvic pain. MS:  Denies joint pain, back pain, weakness, decreased ROM, swelling. NEURO: Denies change in gait, seizures, loss of sensation, dizziness, confusion LOC.  PSY: NO SI/HI. SKIN: Denies Rash, bruising.

## 2023-05-01 NOTE — ED PROVIDER NOTE - CLINICAL SUMMARY MEDICAL DECISION MAKING FREE TEXT BOX
PT with stable VS, no acute distress, non toxic appearing, tolerating PO in the ED, Pt with no acute findings on labs or imaging, Pt cleared for dc home with bed rest till follow up to private OB, return for any change in condition, will treat symptomatic urine pending UC,  supportive care otherwise,  educated about when to return to the ED if needed. PT verbalizes that he understands all instructions and results. Pt informed that ED is open and available 24/7 365 days a yr, encouraged to return to the ED if they have any change in condition, or feel the need for revaluation.    utilized to obtain History, ROS, Physical Exam, explanations of results and plan of care, as well as follow up instructions.

## 2023-05-01 NOTE — ED PROVIDER NOTE - PATIENT PORTAL LINK FT
You can access the FollowMyHealth Patient Portal offered by [facility] by registering at the following website: [email or web address]. By joining [employer]’s FollowMyHealth portal, you will also be able to view your health information using other applications (apps) compatible with our system.

## 2023-05-01 NOTE — ED ADULT NURSE NOTE - AS PAIN REST
Acute on chronic hypoxic respiratory failure  Copd exacerbation, improved  s/p HCAP  CHF-decompensated        oxygen per pulse oximetry  off antibiotic, monitor wbc  completed prednisone course  continue symbicort and spiriva  agree with iv lasix today, possible switch to 40 mg po in am  repeat pro bnp please  monitor i/os cr and lytes  out of bed/physical therapy  discussed with house staff 5 (moderate pain)

## 2023-05-01 NOTE — ED ADULT NURSE NOTE - OBJECTIVE STATEMENT
pt is a 31 y/o/f who is 10 wks pregnant with twins presents c/o abd pain since friday. pain started when she was at work, denies heavy lifting/injury. pt states as of yesterday night the pain was more constant and intense, worse with movement. denies cramping, bleeding, discharge. denies fevers, chills, nausea, vomiting. iv place, blood work sent to lab. awaiting US

## 2023-05-01 NOTE — ED PROVIDER NOTE - NSFOLLOWUPINSTRUCTIONS_ED_ALL_ED_FT
Amenaza de aborto  Threatened Miscarriage       La amenaza de aborto se produce cuando arias mustapha tiene hemorragia vaginal rubi las primeras 20 semanas de embarazo, jb el embarazo no se interrumpe. Si rubi royce período usted tiene hemorragia vaginal, el médico le hará pruebas para asegurarse de que el embarazo continúe. Si las pruebas muestran que usted continúa embarazada y que el "bebé" en desarrollo (feto) dentro del útero sigue creciendo, se considera que tuvo arias amenaza de aborto.    La amenaza de aborto no implica que el embarazo vaya a terminar, jb sí aumenta el riesgo de perder el embarazo (aborto completo).    ¿Cuáles son las causas?  Por lo general, se desconoce la causa de esta afección. Si el resultado final es el aborto completo, la causa más frecuente es la cantidad anormal de cromosomas del feto. Los cromosomas son las estructuras internas de las células que contienen todo el material genético de airas persona.    ¿Qué incrementa el riesgo?  Los siguientes factores del estilo de jewell pueden aumentar el riesgo de aborto al comienzo del embarazo:    Fumar.  El consumo de cantidades excesivas de alcohol o cafeína.  El consumo de drogas.    Las siguientes enfermedades preexistentes pueden aumentar el riesgo de aborto al comienzo del embarazo:    Síndrome del ovario poliquístico.  Fibromas uterinos.  Infecciones.  Diabetes mellitus.    ¿Cuáles son los signos o los síntomas?  Los síntomas de esta afección incluyen los siguientes:    Hemorragia vaginal.  Dolor o cólicos abdominales leves.    ¿Cómo se diagnostica?  Si tiene hemorragia con o sin dolor abdominal antes de las 20 semanas de embarazo, el médico le hará pruebas para determinar si el embarazo continúa. Estas incluirán lo siguiente:    Ecografía. Royce estudio usa ondas sonoras para crear imágenes del interior del útero. [place] permite que el médico al al bebé en gestación y otras estructuras, kevan la placenta.  Examen pélvico. Royce es un examen interno de la vagina y del gloria uterino.  Medición de la frecuencia cardíaca del feto.  Pruebas de laboratorio, kevan análisis de gia, análisis de orina o hisopados para detectar arias infección.    Es posible que le diagnostiquen arias amenaza de aborto en los siguientes casos:    La ecografía muestra que el embarazo continúa.  La frecuencia cardíaca del feto es reta.  El examen pélvico muestra que la apertura entre el útero y la vagina (gloria uterino) está cerrada.  Los análisis de gia confirman que el embarazo continúa.    ¿Cómo se trata?  No se ha demostrado que ningún tratamiento evite que arias amenaza de aborto se convierta en un aborto completo. Sin embargo, los cuidados adecuados en el [facility] son importantes.    Siga estas indicaciones en nash casa:  Descanse lo suficiente.  No tenga relaciones sexuales ni use tampones si tiene hemorragia vaginal.  No se brigette duchas vaginales.  No fume ni consuma drogas.  No raimundo alcohol.  Evite la cafeína.  Vaya a todas las visitas de control prenatales y de control kevan se lo haya indicado el médico. [place] es importante.    Comuníquese con un médico si:  Tiene arias ligera hemorragia o manchado vaginal rubi el embarazo.  Tiene dolor o cólicos en el abdomen.  Tiene fiebre.    Solicite ayuda de inmediato si:  Tiene arias hemorragia vaginal abundante.  Elimina coágulos de gia por la vagina.  Elimina tejidos por la vagina.  Tiene arias pérdida de líquido o le sale líquido a chorros por la vagina.  Siente dolor en la parte baja de la espalda o cólicos abdominales intensos.  Tiene fiebre, escalofríos y dolor abdominal intenso.    Resumen  La amenaza de aborto se produce cuando arias mustapha tiene hemorragia vaginal rubi las primeras 20 semanas de embarazo, jb el embarazo no se interrumpe.  Por lo general, no se conoce la causa de la amenaza de aborto.  Entre los síntomas de esta afección, se incluyen hemorragia vaginal y cólicos o dolor abdominal leve.  No se ha demostrado que ningún tratamiento evite que arias amenaza de aborto se convierta en un aborto completo.  Vaya a todas las visitas de control prenatales y de control kevan se lo haya indicado el médico. [place] es importante.    NOTAS ADICIONALES E INSTRUCCIONES    Please follow up with your Primary MD in 24-48 hr.  Seek immediate medical care for any new/worsening signs or symptoms.

## 2023-05-01 NOTE — ED ADULT NURSE NOTE - CHIEF COMPLAINT QUOTE
patient 10 weeks pregnant with twins, states since friday she has had worsening lower abdominal pain. 2 days of N/V/D. no vaginal bleeding or discharge noted. LMP 2/15/23.

## 2023-05-02 LAB
CULTURE RESULTS: SIGNIFICANT CHANGE UP
SPECIMEN SOURCE: SIGNIFICANT CHANGE UP

## 2023-09-25 NOTE — ED STATDOCS - WORK/EXCUSE FORM DATE
Quality 130: Documentation Of Current Medications In The Medical Record: Current Medications Documented Detail Level: Detailed Quality 431: Preventive Care And Screening: Unhealthy Alcohol Use - Screening: Patient screened for unhealthy alcohol use using a single question and scores less than 2 times per year Quality 226: Preventive Care And Screening: Tobacco Use: Screening And Cessation Intervention: Patient screened for tobacco use and is an ex/non-smoker 03-Jul-2020

## 2024-03-01 ENCOUNTER — EMERGENCY (EMERGENCY)
Facility: HOSPITAL | Age: 34
LOS: 1 days | Discharge: HOME | End: 2024-03-01
Attending: STUDENT IN AN ORGANIZED HEALTH CARE EDUCATION/TRAINING PROGRAM
Payer: MEDICAID

## 2024-03-01 VITALS
SYSTOLIC BLOOD PRESSURE: 109 MMHG | WEIGHT: 160.06 LBS | DIASTOLIC BLOOD PRESSURE: 76 MMHG | HEART RATE: 80 BPM | OXYGEN SATURATION: 98 % | TEMPERATURE: 100 F | RESPIRATION RATE: 18 BRPM

## 2024-03-01 VITALS
SYSTOLIC BLOOD PRESSURE: 99 MMHG | HEART RATE: 78 BPM | RESPIRATION RATE: 18 BRPM | TEMPERATURE: 99 F | OXYGEN SATURATION: 97 % | DIASTOLIC BLOOD PRESSURE: 68 MMHG

## 2024-03-01 DIAGNOSIS — Z98.84 BARIATRIC SURGERY STATUS: Chronic | ICD-10-CM

## 2024-03-01 LAB
B PERT DNA SPEC QL NAA+PROBE: SIGNIFICANT CHANGE UP
C PNEUM DNA SPEC QL NAA+PROBE: SIGNIFICANT CHANGE UP
FLUAV H1 2009 PAND RNA SPEC QL NAA+PROBE: DETECTED
FLUAV H1 RNA SPEC QL NAA+PROBE: SIGNIFICANT CHANGE UP
FLUAV H3 RNA SPEC QL NAA+PROBE: SIGNIFICANT CHANGE UP
FLUAV SUBTYP SPEC NAA+PROBE: DETECTED
FLUBV RNA SPEC QL NAA+PROBE: SIGNIFICANT CHANGE UP
HADV DNA SPEC QL NAA+PROBE: SIGNIFICANT CHANGE UP
HCOV PNL SPEC NAA+PROBE: SIGNIFICANT CHANGE UP
HMPV RNA SPEC QL NAA+PROBE: SIGNIFICANT CHANGE UP
HPIV1 RNA SPEC QL NAA+PROBE: SIGNIFICANT CHANGE UP
HPIV2 RNA SPEC QL NAA+PROBE: SIGNIFICANT CHANGE UP
HPIV3 RNA SPEC QL NAA+PROBE: SIGNIFICANT CHANGE UP
HPIV4 RNA SPEC QL NAA+PROBE: SIGNIFICANT CHANGE UP
RAPID RVP RESULT: DETECTED
RV+EV RNA SPEC QL NAA+PROBE: SIGNIFICANT CHANGE UP
S PYO DNA THROAT QL NAA+PROBE: DETECTED
SARS-COV-2 RNA SPEC QL NAA+PROBE: SIGNIFICANT CHANGE UP

## 2024-03-01 PROCEDURE — 96372 THER/PROPH/DIAG INJ SC/IM: CPT

## 2024-03-01 PROCEDURE — 71045 X-RAY EXAM CHEST 1 VIEW: CPT | Mod: 26

## 2024-03-01 PROCEDURE — 99284 EMERGENCY DEPT VISIT MOD MDM: CPT

## 2024-03-01 PROCEDURE — 87651 STREP A DNA AMP PROBE: CPT

## 2024-03-01 PROCEDURE — 0225U NFCT DS DNA&RNA 21 SARSCOV2: CPT

## 2024-03-01 PROCEDURE — T1013: CPT

## 2024-03-01 PROCEDURE — 87798 DETECT AGENT NOS DNA AMP: CPT

## 2024-03-01 PROCEDURE — 71045 X-RAY EXAM CHEST 1 VIEW: CPT

## 2024-03-01 RX ORDER — KETOROLAC TROMETHAMINE 30 MG/ML
30 SYRINGE (ML) INJECTION ONCE
Refills: 0 | Status: DISCONTINUED | OUTPATIENT
Start: 2024-03-01 | End: 2024-03-01

## 2024-03-01 RX ORDER — AMOXICILLIN 250 MG/5ML
1 SUSPENSION, RECONSTITUTED, ORAL (ML) ORAL
Qty: 20 | Refills: 0
Start: 2024-03-01 | End: 2024-03-10

## 2024-03-01 RX ORDER — DEXAMETHASONE 0.5 MG/5ML
10 ELIXIR ORAL ONCE
Refills: 0 | Status: COMPLETED | OUTPATIENT
Start: 2024-03-01 | End: 2024-03-01

## 2024-03-01 RX ADMIN — Medication 30 MILLIGRAM(S): at 11:20

## 2024-03-01 RX ADMIN — Medication 10 MILLIGRAM(S): at 11:26

## 2024-03-01 NOTE — ED PROVIDER NOTE - ATTENDING APP SHARED VISIT CONTRIBUTION OF CARE
Patient with no past medical history presents with fever, cough, headaches, congestion.  Patient with benign exam here, neuro intact.  Found to be strep positive as well as flu positive.  Patient given antibiotics and supportive care.  Stable for DC with follow-up.  Given strict return precautions.  Xray films demonstrate no acute findings

## 2024-03-01 NOTE — ED PROVIDER NOTE - PATIENT PORTAL LINK FT
You can access the FollowMyHealth Patient Portal offered by [facility] by registering at the following website: [email or web address]. By joining [employer]’s FollowMyHealth portal, you will also be able to view your health information using other applications (apps) compatible with our system.

## 2024-03-01 NOTE — ED ADULT NURSE NOTE - OBJECTIVE STATEMENT
patient presents to ED reporting cough, congestion, body aches, fever, and chills x2 weeks. Patient endorses nausea and an incidence of vomiting last night. Denies urinary symptoms

## 2024-03-01 NOTE — ED ADULT NURSE REASSESSMENT NOTE - NS ED NURSE REASSESS COMMENT FT1
ED  blanka utilized for interview:  Patient reports she "feels a little better" body aches have improved. afebrile

## 2024-03-01 NOTE — ED PROVIDER NOTE - CLINICAL SUMMARY MEDICAL DECISION MAKING FREE TEXT BOX
32 y/o F with no reported PMHx p/w c/o cough at times productive of phlegm, fever, runny nose x 2 weeks and atraumatic headaches x 2 days. Most likely viral illness. Plan for meds, RSV/COVID and CXR. Instructed to f/u with PCP within 2-3 days. 34 y/o F with no reported PMHx p/w c/o cough at times productive of phlegm, fever, runny nose x 2 weeks and atraumatic headaches x 2 days. Given meds. (+) strep throat (+) influenza A. CXR wet read no acute findings. Rx amoxicillin and instructed to f/u with PCP within 2-3 days. Strict ED return precautions given if any new or worsening symptoms.

## 2024-03-01 NOTE — ED ADULT NURSE NOTE - CHIEF COMPLAINT QUOTE
Presents to ED c/o 2 weeks of fever, cough and headache. Pt states that she has been sick, but last night developed a headache which promoted her to come to the ED.

## 2024-03-01 NOTE — ED PROVIDER NOTE - PHYSICAL EXAMINATION
Constitutional: Awake, alert and oriented. In no acute distress.   HEENT: NC/AT. Moist mucous membranes.  Eyes: No scleral icterus. EOMI.  Neck:. Soft and supple. Full ROM without pain. No meningeal signs.   Cardiac: Regular rate and regular rhythm. +S1/S2. Peripheral pulses 2+ and symmetric. No LE edema.  Respiratory: Speaking in full sentences. No evidence of respiratory distress. No wheezes, rales or rhonchi.  Abdomen: Soft, non-distended and non tender Normal bowel sounds in all 4 quadrants. No guarding or rebound. no CVAT  Back: Spine midline and non-tender.   Skin: No rashes, abrasions or lacerations.  Lymph: No cervical lymphadenopathy.  Neuro: Awake, alert & oriented x 3. Moves all extremities symmetrically. Negative pronator drift, strength 5/5 all upper and lower extremities, sensation to light touch intact throughout upper/ lower extremities and face, finger to nose coordination intact, cranial nerves 2-12 intact  Psych: calm, cooperative, normal affect

## 2024-03-01 NOTE — ED PROVIDER NOTE - NSFOLLOWUPINSTRUCTIONS_ED_ALL_ED_FT
Please take tylenol or motrin as needed for fever.   1)Follow up with your PCP in 1 week  Return to the emergency room if you are experiencing any new or worsening symptoms      Viral Respiratory Infection    A viral respiratory infection is an illness that affects parts of the body used for breathing, like the lungs, nose, and throat. It is caused by a germ called a virus. Symptoms can include runny nose, coughing, sneezing, fatigue, body aches, sore throat, fever, or headache. Over the counter medicine can be used to manage the symptoms but the infection typically goes away on its own in 5 to 10 days.     SEEK IMMEDIATE MEDICAL CARE IF YOU HAVE ANY OF THE FOLLOWING SYMPTOMS: shortness of breath, chest pain, fever over 10 days, or lightheadedness/dizziness. Please take tylenol or motrin as needed for fever.   1)Follow up with your PCP in 1 week  Return to the emergency room if you are experiencing any new or worsening symptoms      Strep Throat  ImageStrep throat is a bacterial infection of the throat. Your health care provider may call the infection tonsillitis or pharyngitis, depending on whether there is swelling in the tonsils or at the back of the throat. Strep throat is most common during the cold months of the year in children who are 5–15 years of age, but it can happen during any season in people of any age. This infection is spread from person to person (contagious) through coughing, sneezing, or close contact.    What are the causes?  Strep throat is caused by the bacteria called Streptococcus pyogenes.    What increases the risk?  This condition is more likely to develop in:    People who spend time in crowded places where the infection can spread easily.  People who have close contact with someone who has strep throat.    What are the signs or symptoms?  Symptoms of this condition include:    Fever or chills.  Redness, swelling, or pain in the tonsils or throat.  Pain or difficulty when swallowing.  White or yellow spots on the tonsils or throat.  Swollen, tender glands in the neck or under the jaw.  Red rash all over the body (rare).    How is this diagnosed?  This condition is diagnosed by performing a rapid strep test or by taking a swab of your throat (throat culture test). Results from a rapid strep test are usually ready in a few minutes, but throat culture test results are available after one or two days.    How is this treated?  This condition is treated with antibiotic medicine.    Follow these instructions at home:  Medicines     Take over-the-counter and prescription medicines only as told by your health care provider.  Take your antibiotic as told by your health care provider. Do not stop taking the antibiotic even if you start to feel better.  Have family members who also have a sore throat or fever tested for strep throat. They may need antibiotics if they have the strep infection.  Eating and drinking     Do not share food, drinking cups, or personal items that could cause the infection to spread to other people.  If swallowing is difficult, try eating soft foods until your sore throat feels better.  Drink enough fluid to keep your urine clear or pale yellow.  General instructions     Gargle with a salt-water mixture 3–4 times per day or as needed. To make a salt-water mixture, completely dissolve ½–1 tsp of salt in 1 cup of warm water.  Make sure that all household members wash their hands well.  Get plenty of rest.  Stay home from school or work until you have been taking antibiotics for 24 hours.  Keep all follow-up visits as told by your health care provider. This is important.  Contact a health care provider if:  The glands in your neck continue to get bigger.  You develop a rash, cough, or earache.  You cough up a thick liquid that is green, yellow-brown, or bloody.  You have pain or discomfort that does not get better with medicine.  Your problems seem to be getting worse rather than better.  You have a fever.  Get help right away if:  You have new symptoms, such as vomiting, severe headache, stiff or painful neck, chest pain, or shortness of breath.  You have severe throat pain, drooling, or changes in your voice.  You have swelling of the neck, or the skin on the neck becomes red and tender.  You have signs of dehydration, such as fatigue, dry mouth, and decreased urination.  You become increasingly sleepy, or you cannot wake up completely.  Your joints become red or painful.  This information is not intended to replace advice given to you by your health care provider. Make sure you discuss any questions you have with your health care provider.

## 2024-03-01 NOTE — ED PROVIDER NOTE - OBJECTIVE STATEMENT
34 y/o F with no reported PMHx p/w c/o cough at times productive of phlegm, fever, runny nose x 2 weeks and atraumatic headaches x 2 days. Reports of positive sick contacts at home. Denies n/v, sob, chest pain, hemoptysis, palpitations, leg swelling/calf tenderness, ear pain, sore throat, dizziness, changes in vision, numbness, rash, abdominal pain, back pain, dysuria/hematuria, diarrhea/bloody stools/melena and with no other c/o.

## 2024-03-01 NOTE — ED PROVIDER NOTE - CARE PLAN
1 Principal Discharge DX:	Viral illness   Principal Discharge DX:	Influenza A  Secondary Diagnosis:	Strep throat

## 2024-07-15 ENCOUNTER — EMERGENCY (EMERGENCY)
Facility: HOSPITAL | Age: 34
LOS: 1 days | Discharge: HOME | End: 2024-07-15
Attending: EMERGENCY MEDICINE | Admitting: EMERGENCY MEDICINE
Payer: MEDICAID

## 2024-07-15 VITALS
DIASTOLIC BLOOD PRESSURE: 47 MMHG | OXYGEN SATURATION: 95 % | SYSTOLIC BLOOD PRESSURE: 111 MMHG | TEMPERATURE: 99 F | HEART RATE: 67 BPM | RESPIRATION RATE: 17 BRPM

## 2024-07-15 DIAGNOSIS — Z98.84 BARIATRIC SURGERY STATUS: Chronic | ICD-10-CM

## 2024-07-15 LAB
BASE EXCESS BLDV CALC-SCNC: 0.7 MMOL/L — SIGNIFICANT CHANGE UP (ref -2–3)
BASOPHILS # BLD AUTO: 0.04 K/UL — SIGNIFICANT CHANGE UP (ref 0–0.2)
BASOPHILS NFR BLD AUTO: 0.6 % — SIGNIFICANT CHANGE UP (ref 0–2)
CA-I SERPL-SCNC: 1.24 MMOL/L — SIGNIFICANT CHANGE UP (ref 1.15–1.33)
CHLORIDE BLDV-SCNC: 103 MMOL/L — SIGNIFICANT CHANGE UP (ref 96–108)
EOSINOPHIL # BLD AUTO: 0.1 K/UL — SIGNIFICANT CHANGE UP (ref 0–0.5)
EOSINOPHIL NFR BLD AUTO: 1.4 % — SIGNIFICANT CHANGE UP (ref 0–6)
GAS PNL BLDV: 137 MMOL/L — SIGNIFICANT CHANGE UP (ref 136–145)
GAS PNL BLDV: SIGNIFICANT CHANGE UP
GLUCOSE BLDV-MCNC: 91 MG/DL — SIGNIFICANT CHANGE UP (ref 70–99)
HCO3 BLDV-SCNC: 27 MMOL/L — SIGNIFICANT CHANGE UP (ref 22–29)
HCT VFR BLD CALC: 38.8 % — SIGNIFICANT CHANGE UP (ref 34.5–45)
HCT VFR BLDA CALC: 39 % — SIGNIFICANT CHANGE UP
HGB BLD CALC-MCNC: 13.1 G/DL — SIGNIFICANT CHANGE UP (ref 11.7–16.1)
HGB BLD-MCNC: 12.5 G/DL — SIGNIFICANT CHANGE UP (ref 11.5–15.5)
IMM GRANULOCYTES NFR BLD AUTO: 0.3 % — SIGNIFICANT CHANGE UP (ref 0–0.9)
LACTATE BLDV-MCNC: 0.9 MMOL/L — SIGNIFICANT CHANGE UP (ref 0.5–2)
LYMPHOCYTES # BLD AUTO: 2.49 K/UL — SIGNIFICANT CHANGE UP (ref 1–3.3)
LYMPHOCYTES # BLD AUTO: 34.3 % — SIGNIFICANT CHANGE UP (ref 13–44)
MCHC RBC-ENTMCNC: 28.9 PG — SIGNIFICANT CHANGE UP (ref 27–34)
MCHC RBC-ENTMCNC: 32.2 GM/DL — SIGNIFICANT CHANGE UP (ref 32–36)
MCV RBC AUTO: 89.8 FL — SIGNIFICANT CHANGE UP (ref 80–100)
MONOCYTES # BLD AUTO: 0.47 K/UL — SIGNIFICANT CHANGE UP (ref 0–0.9)
MONOCYTES NFR BLD AUTO: 6.5 % — SIGNIFICANT CHANGE UP (ref 2–14)
NEUTROPHILS # BLD AUTO: 4.13 K/UL — SIGNIFICANT CHANGE UP (ref 1.8–7.4)
NEUTROPHILS NFR BLD AUTO: 56.9 % — SIGNIFICANT CHANGE UP (ref 43–77)
PCO2 BLDV: 55 MMHG — HIGH (ref 39–42)
PH BLDV: 7.3 — LOW (ref 7.32–7.43)
PLATELET # BLD AUTO: 180 K/UL — SIGNIFICANT CHANGE UP (ref 150–400)
PO2 BLDV: 53 MMHG — HIGH (ref 25–45)
POTASSIUM BLDV-SCNC: 4.1 MMOL/L — SIGNIFICANT CHANGE UP (ref 3.5–5.1)
RBC # BLD: 4.32 M/UL — SIGNIFICANT CHANGE UP (ref 3.8–5.2)
RBC # FLD: 13.2 % — SIGNIFICANT CHANGE UP (ref 10.3–14.5)
SAO2 % BLDV: 83.4 % — SIGNIFICANT CHANGE UP
WBC # BLD: 7.25 K/UL — SIGNIFICANT CHANGE UP (ref 3.8–10.5)
WBC # FLD AUTO: 7.25 K/UL — SIGNIFICANT CHANGE UP (ref 3.8–10.5)

## 2024-07-15 PROCEDURE — 99285 EMERGENCY DEPT VISIT HI MDM: CPT

## 2024-07-15 RX ORDER — SODIUM CHLORIDE 0.9 % (FLUSH) 0.9 %
1000 SYRINGE (ML) INJECTION ONCE
Refills: 0 | Status: COMPLETED | OUTPATIENT
Start: 2024-07-15 | End: 2024-07-15

## 2024-07-15 RX ORDER — MORPHINE SULFATE 100 MG/1
4 TABLET, EXTENDED RELEASE ORAL ONCE
Refills: 0 | Status: DISCONTINUED | OUTPATIENT
Start: 2024-07-15 | End: 2024-07-15

## 2024-07-15 RX ORDER — ONDANSETRON HYDROCHLORIDE 2 MG/ML
4 INJECTION INTRAMUSCULAR; INTRAVENOUS ONCE
Refills: 0 | Status: DISCONTINUED | OUTPATIENT
Start: 2024-07-15 | End: 2024-07-23

## 2024-07-15 RX ADMIN — MORPHINE SULFATE 4 MILLIGRAM(S): 100 TABLET, EXTENDED RELEASE ORAL at 23:24

## 2024-07-15 RX ADMIN — Medication 1000 MILLILITER(S): at 23:24

## 2024-07-15 NOTE — ED ADULT NURSE NOTE - NSFALLUNIVINTERV_ED_ALL_ED
Bed/Stretcher in lowest position, wheels locked, appropriate side rails in place/Call bell, personal items and telephone in reach/Instruct patient to call for assistance before getting out of bed/chair/stretcher/Non-slip footwear applied when patient is off stretcher/[place] to call system/Physically safe environment - no spills, clutter or unnecessary equipment/Purposeful proactive rounding/Room/bathroom lighting operational, light cord in reach

## 2024-07-15 NOTE — ED PROVIDER NOTE - PATIENT PORTAL LINK FT
You can access the FollowMyHealth Patient Portal offered by [facility] by registering at the following website: [email or web address]. By joining [employer]’s FollowMyHealth portal, you will also be able to view your health information using other applications (apps) compatible with our system.

## 2024-07-15 NOTE — ED PROVIDER NOTE - CLINICAL SUMMARY MEDICAL DECISION MAKING FREE TEXT BOX
Patient is a 32yo F with no significant PMHx who presents to the ED complaining of LLQ abdominal pain/pelvic pain. Will get labs, US to r/o ectopic, CT, treat pain.

## 2024-07-15 NOTE — ED ADULT NURSE NOTE - OBJECTIVE STATEMENT
Patient alert and oriented x 4, respirations even symmetrical and unlabored on room air, Normocephalic, normal speech, abdomen nontender at this time.  patient complains of pelvic pain/discomfort, updated on the plan of care, Stretcher locked in lowest position, pt educated on how to call nurse, IV site flushed w/ NS. No signs of infiltration noted to site. No signs of acute distress noted, safety maintained. Comfort monitored.

## 2024-07-15 NOTE — ED PROVIDER NOTE - PROGRESS NOTE DETAILS
Sophie Dahl PA : pt signed out to me to f/u US and CT  US and CT notable for 5cm left adenxal cystic mass. No torsion.  Pain improving  UA neg  Pt reports being told of ovarian cyst 2 yrs ago but has not f/u since then. Has upcoming gyn appt within next 2 weeks. Advised motrin/tylenol , return precautions

## 2024-07-15 NOTE — ED PROVIDER NOTE - NSFOLLOWUPINSTRUCTIONS_ED_ALL_ED_FT
[place]ali ibuprofeno 600 mg cada 6 horas según sea necesario para el dolor.  [place] tylenol 650 mg cada 6 horas según sea necesario para el dolor.  Seguimiento con ginecología según lo programado.  Necesitará repetir la ecografía pélvica en 6 a 10 semanas.  Regrese a la jayne de emergencias si el dolor, los vómitos u otros síntomas nuevos o que empeoran de manera aguda empeoran    Quiste ovárico  Ovarian Cyst       Un quiste ovárico es arias bolsa llena de líquido que se forma en el ovario. Los ovarios son órganos pequeños que producen óvulos en las mujeres. Se pueden formar varios tipos de quistes en los ovarios. Algunos pueden provocar síntomas y requerir tratamiento. En nash mayoría, los quistes ováricos desaparecen solos, no son cancerosos (son benignos) y no causan problemas.    ¿Cuáles son las causas?  Algunas de las causas de los quistes ováricos son las siguientes:    Síndrome de hiperestimulación ovárica. Esta es arias afección que puede aparecer debido a la elizabeth de medicamentos para la fertilidad. Provoca la formación de varios quistes ováricos grandes.  Síndrome del ovario poliquístico (SOP). Royce es un trastorno hormonal frecuente que puede producir quistes ováricos, y puede producir problemas en el período o la fertilidad.    ¿Qué incrementa el riesgo?  Los siguientes factores pueden hacer que sea más propensa a desarrollar esta afección:    Tener sobrepeso u obesidad.  Gerard medicamentos para la fertilidad.  Usar algunos métodos anticonceptivos.  Fumar.    ¿Cuáles son los signos o síntomas?  Muchos quistes ováricos no causan síntomas. Si se presentan síntomas, estos pueden incluir los siguientes:    Dolor o molestias en la pelvis.  Dolor en la parte baja del abdomen.  Dolor al tener sexo.  Hinchazón abdominal.  Períodos menstruales anormales.  Aumento del dolor rubi los períodos menstruales.    ¿Cómo se diagnostica?  Estos quistes se descubren comúnmente rubi un examen de rutina o arias exploración ginecológica. Puede realizarse exámenes para obtener más información sobre el quiste, kevan los siguientes:    Ecografía.  Radiografías de la pelvis.  Exploración por tomografía computarizada (TC).  Resonancia magnética (RM).  Análisis de gia.    ¿Cómo se trata?  Muchos de los quistes ováricos desaparecen por sí solos, sin tratamiento. Es probable que el médico quiera controlar el quiste regularmente rubi 2 o 3 meses para sherie si se produce algún cambio. Si está en la menopausia, es especialmente importante controlar el quiste cuidadosamente porque las mujeres menopáusicas tienen un índice mayor de cáncer de ovario.    Si se necesita tratamiento, royce puede incluir lo siguiente:    Gerard medicamentos para aliviar el dolor.  Un procedimiento para drenar el quiste (aspiración).  Cirugía para extirpar el quiste completo.  Tratamiento hormonal o píldoras anticonceptivas. Estos métodos a veces se usan para ayudar a disolver un quiste.    Siga estas instrucciones en nash casa:  Use los medicamentos de venta akanksha y los recetados solamente kevan se lo haya indicado el médico.  Pregúntele al médico si algún medicamento recetado le impide conducir o usar maquinaria.  Realícese exámenes pélvicos periódicos y pruebas de Papanicolaou con la frecuencia que le indique el médico.  Retome miguel angel actividades normales según lo indicado por el médico. Pregúntele al médico qué actividades son seguras para usted.  No consuma ningún producto que contenga nicotina o tabaco, kevan cigarrillos, cigarrillos electrónicos y tabaco de mascar. Si necesita ayuda para dejar de consumir estos productos, consulte al médico.  Concurra a todas las visitas de seguimiento kevan se lo haya indicado el médico. [place] es importante.    Comuníquese con un médico si:  Los períodos se atrasan, son irregulares, dolorosos o cesan.  Tiene dolor pélvico que no desaparece.  Siente presión en la vejiga o no puede vaciarla completamente.  Tiene alguno de los siguientes síntomas:    Sensación de tener el estómago lleno.  Aumenta o disminuye de peso sin hacer modificaciones en nash actividad física y en nash dieta habitual.  Dolor, hinchazón o distensión en el abdomen.  Pérdida del apetito.  Dolor y presión en la espalda y la pelvis.  Anabell que puede estar embarazada.    Solicite ayuda de inmediato si:  Tiene un dolor abdominal o pélvico intenso o que empeora.  No puede comer ni beber sin vomitar.  Tiene escalofríos o fiebre repentinamente.  Nash período menstrual es mucho más profuso de lo normal.    Resumen  Un quiste ovárico es arias bolsa llena de líquido que se forma en el ovario.  Algunos pueden provocar síntomas y requerir tratamiento.  Estos quistes se descubren comúnmente rubi un examen de rutina o arias exploración ginecológica.  Muchos de los quistes ováricos desaparecen por sí solos, sin tratamiento.

## 2024-07-15 NOTE — ED PROVIDER NOTE - OBJECTIVE STATEMENT
Patient is a 34yo F with no significant PMHx who presents to the ED complaining of LLQ abdominal pain/pelvic pain. Patient states she was driving home and started feeling some pelvic discomfort while holding her pee, then when she got home and peed, it burned and got worse. Denies fever, nausea, vomiting, diarrhea, constipation. +Chills, back pain, LMP 2 weeks ago.

## 2024-07-15 NOTE — ED PROVIDER NOTE - PHYSICAL EXAMINATION
Gen: AAOx3, uncomfortable, rigoring in bed.  HEENT: Normocephalic atraumatic. EOMI. No scleral icterus. Moist mucus membranes.  CV: RRR. Audible S1 and S2. No murmurs. 2+ radial and PT pulses   Pulm: Clear to auscultation bilaterally. No wheezes, rales, or rhonchi. No accessory muscle use or respiratory distress.  Abdomen: soft, normoactive BS, non distended, +LLQ ttp, no rebound, no guarding  Musculoskeletal:  Moving all extremities equally. No gross deformity. No tenderness to palpation.  Skin: No rashes or lesions. Warm.  Neurologic: No focal neurological deficits. CN II-XII grossly intact.  : +L CVA tenderness  Psych: Appropriate mood and affect. Cooperative.

## 2024-07-15 NOTE — ED PROVIDER NOTE - ATTENDING APP SHARED VISIT CONTRIBUTION OF CARE
I, Candido Garrett MD, performed the initial face to face bedside interview with this patient regarding history of present illness, review of symptoms and relevant past medical, social and family history.  I completed an independent physical examination.  I was the initial provider who evaluated this patient. I have signed out the follow up of any pending tests (i.e. labs, radiological studies) to the ACP.  I have communicated the patient’s plan of care and disposition with the ACP.    33 year old female c/o left adnexal pain. + Left adnexal tenderness. labs, diagnostic imaging

## 2024-07-15 NOTE — ED ADULT NURSE NOTE - TEMPLATE
Patient partially removed NG.  Patient had managed to remove Left Mitt with knees and then proceed to pull at NG.  This will be the 4th reinsertion of NG.    Will begin diet today. NG remains needed for nutrition supplementation.  KUB for tube check ordered    
General

## 2024-07-15 NOTE — ED PROVIDER NOTE - NS ED ROS FT
General: No fever. + chills.  Respiratory: No SOB  Cardiac: No chest pain  GI: + abdominal pain. No nausea, vomiting  Neuro: No headache  MSK: +Body aches, back pain  Psych: No known mental health issues.  Endocrine: No heat/cold intolerance, no polyuria/polydipsia.  Heme: No easy bruising or bleeding.  Allergic: No pruritis, dermatitis, or environmental allergies.

## 2024-07-16 LAB
ALBUMIN SERPL ELPH-MCNC: 3.8 G/DL — SIGNIFICANT CHANGE UP (ref 3.3–5.2)
ALP SERPL-CCNC: 40 U/L — SIGNIFICANT CHANGE UP (ref 40–120)
ALT FLD-CCNC: 23 U/L — SIGNIFICANT CHANGE UP
ANION GAP SERPL CALC-SCNC: 10 MMOL/L — SIGNIFICANT CHANGE UP (ref 5–17)
APPEARANCE UR: CLEAR — SIGNIFICANT CHANGE UP
AST SERPL-CCNC: 24 U/L — SIGNIFICANT CHANGE UP
BILIRUB SERPL-MCNC: <0.2 MG/DL — LOW (ref 0.4–2)
BILIRUB UR-MCNC: NEGATIVE — SIGNIFICANT CHANGE UP
BUN SERPL-MCNC: 8.3 MG/DL — SIGNIFICANT CHANGE UP (ref 8–20)
CALCIUM SERPL-MCNC: 9.1 MG/DL — SIGNIFICANT CHANGE UP (ref 8.4–10.5)
CHLORIDE SERPL-SCNC: 103 MMOL/L — SIGNIFICANT CHANGE UP (ref 96–108)
CO2 SERPL-SCNC: 25 MMOL/L — SIGNIFICANT CHANGE UP (ref 22–29)
COLOR SPEC: YELLOW — SIGNIFICANT CHANGE UP
CREAT SERPL-MCNC: 0.54 MG/DL — SIGNIFICANT CHANGE UP (ref 0.5–1.3)
DIFF PNL FLD: NEGATIVE — SIGNIFICANT CHANGE UP
EGFR: 125 ML/MIN/1.73M2 — SIGNIFICANT CHANGE UP
GLUCOSE SERPL-MCNC: 92 MG/DL — SIGNIFICANT CHANGE UP (ref 70–99)
GLUCOSE UR QL: NEGATIVE MG/DL — SIGNIFICANT CHANGE UP
HCG SERPL-ACNC: <4 MIU/ML — SIGNIFICANT CHANGE UP
KETONES UR-MCNC: NEGATIVE MG/DL — SIGNIFICANT CHANGE UP
LEUKOCYTE ESTERASE UR-ACNC: NEGATIVE — SIGNIFICANT CHANGE UP
LIDOCAIN IGE QN: 19 U/L — LOW (ref 22–51)
NITRITE UR-MCNC: NEGATIVE — SIGNIFICANT CHANGE UP
PH UR: 7 — SIGNIFICANT CHANGE UP (ref 5–8)
POTASSIUM SERPL-MCNC: 4.2 MMOL/L — SIGNIFICANT CHANGE UP (ref 3.5–5.3)
POTASSIUM SERPL-SCNC: 4.2 MMOL/L — SIGNIFICANT CHANGE UP (ref 3.5–5.3)
PROT SERPL-MCNC: 6.6 G/DL — SIGNIFICANT CHANGE UP (ref 6.6–8.7)
PROT UR-MCNC: NEGATIVE MG/DL — SIGNIFICANT CHANGE UP
SODIUM SERPL-SCNC: 138 MMOL/L — SIGNIFICANT CHANGE UP (ref 135–145)
SP GR SPEC: 1.03 — SIGNIFICANT CHANGE UP (ref 1–1.03)
UROBILINOGEN FLD QL: 0.2 MG/DL — SIGNIFICANT CHANGE UP (ref 0.2–1)

## 2024-07-16 PROCEDURE — 82803 BLOOD GASES ANY COMBINATION: CPT

## 2024-07-16 PROCEDURE — 80053 COMPREHEN METABOLIC PANEL: CPT

## 2024-07-16 PROCEDURE — 85018 HEMOGLOBIN: CPT

## 2024-07-16 PROCEDURE — 74177 CT ABD & PELVIS W/CONTRAST: CPT | Mod: MC

## 2024-07-16 PROCEDURE — 99285 EMERGENCY DEPT VISIT HI MDM: CPT | Mod: 25

## 2024-07-16 PROCEDURE — 84132 ASSAY OF SERUM POTASSIUM: CPT

## 2024-07-16 PROCEDURE — 82330 ASSAY OF CALCIUM: CPT

## 2024-07-16 PROCEDURE — 83605 ASSAY OF LACTIC ACID: CPT

## 2024-07-16 PROCEDURE — 82947 ASSAY GLUCOSE BLOOD QUANT: CPT

## 2024-07-16 PROCEDURE — 85014 HEMATOCRIT: CPT

## 2024-07-16 PROCEDURE — 83690 ASSAY OF LIPASE: CPT

## 2024-07-16 PROCEDURE — 93005 ELECTROCARDIOGRAM TRACING: CPT

## 2024-07-16 PROCEDURE — 87086 URINE CULTURE/COLONY COUNT: CPT

## 2024-07-16 PROCEDURE — T1013: CPT

## 2024-07-16 PROCEDURE — 76830 TRANSVAGINAL US NON-OB: CPT | Mod: 26

## 2024-07-16 PROCEDURE — 76830 TRANSVAGINAL US NON-OB: CPT

## 2024-07-16 PROCEDURE — 93010 ELECTROCARDIOGRAM REPORT: CPT

## 2024-07-16 PROCEDURE — 76856 US EXAM PELVIC COMPLETE: CPT

## 2024-07-16 PROCEDURE — 74177 CT ABD & PELVIS W/CONTRAST: CPT | Mod: 26,MC

## 2024-07-16 PROCEDURE — 36415 COLL VENOUS BLD VENIPUNCTURE: CPT

## 2024-07-16 PROCEDURE — 84295 ASSAY OF SERUM SODIUM: CPT

## 2024-07-16 PROCEDURE — 76856 US EXAM PELVIC COMPLETE: CPT | Mod: 26

## 2024-07-16 PROCEDURE — 85025 COMPLETE CBC W/AUTO DIFF WBC: CPT

## 2024-07-16 PROCEDURE — 82435 ASSAY OF BLOOD CHLORIDE: CPT

## 2024-07-16 PROCEDURE — 84702 CHORIONIC GONADOTROPIN TEST: CPT

## 2024-07-16 PROCEDURE — 96374 THER/PROPH/DIAG INJ IV PUSH: CPT | Mod: XU

## 2024-07-16 PROCEDURE — 81003 URINALYSIS AUTO W/O SCOPE: CPT

## 2024-07-17 LAB
CULTURE RESULTS: SIGNIFICANT CHANGE UP
SPECIMEN SOURCE: SIGNIFICANT CHANGE UP

## 2024-07-25 ENCOUNTER — APPOINTMENT (OUTPATIENT)
Dept: OBGYN | Facility: CLINIC | Age: 34
End: 2024-07-25

## 2025-02-12 NOTE — ED ADULT TRIAGE NOTE - AS TEMP SITE
"S-(situation): pt asking for influenza swab    B-(background): symptoms started yesterday. Her niece called her and said she should get swabbed and see about tamiflu. Pt thinks she was exposed to family that had it, but she is unsure if they had a confirmed test or just suspected influenza.     A-(assessment): symptoms include runny nose, chills, fatigue. Denies fever, SOB, sore throat, chest pain. Has been taking vitamin C and \"wellness supplement\"     R-(recommendations): due to no confirmed positive result from exposure source, unable to complete RN protocol. Will send to provider.     *Also asking about Semaglutide, feels like she is maintaining well at current dose, has lost about 5-6 lbs so far. Not having any side effects, would like to increase the dose to the 2.5 mg as previously discussed.        "
oral